# Patient Record
Sex: MALE | URBAN - METROPOLITAN AREA
[De-identification: names, ages, dates, MRNs, and addresses within clinical notes are randomized per-mention and may not be internally consistent; named-entity substitution may affect disease eponyms.]

---

## 2019-02-28 ENCOUNTER — IMPORTED ENCOUNTER (OUTPATIENT)
Dept: URBAN - METROPOLITAN AREA CLINIC 9 | Facility: CLINIC | Age: 84
End: 2019-02-28

## 2019-03-15 ENCOUNTER — IMPORTED ENCOUNTER (OUTPATIENT)
Dept: URBAN - METROPOLITAN AREA CLINIC 9 | Facility: CLINIC | Age: 84
End: 2019-03-15

## 2019-04-01 ENCOUNTER — IMPORTED ENCOUNTER (OUTPATIENT)
Dept: URBAN - METROPOLITAN AREA CLINIC 9 | Facility: CLINIC | Age: 84
End: 2019-04-01

## 2019-05-13 NOTE — PATIENT DISCUSSION
Consult noted for - home health RN for PICC care. I have sent referral in [employer] to [id number] [address].  Thanks JOSEFA Contreras Patient understands condition, prognosis and need for follow up care.

## 2019-05-28 NOTE — PATIENT DISCUSSION
Cataract surgery has been performed in the first eye and activities of daily living are still impaired. The patient would like to proceed with cataract surgery in the second eye as scheduled. The patient elects CV, OD goal of -2.00.

## 2020-11-16 ENCOUNTER — IMPORTED ENCOUNTER (OUTPATIENT)
Dept: URBAN - METROPOLITAN AREA CLINIC 9 | Facility: CLINIC | Age: 85
End: 2020-11-16

## 2020-11-25 ENCOUNTER — IMPORTED ENCOUNTER (OUTPATIENT)
Dept: URBAN - METROPOLITAN AREA CLINIC 9 | Facility: CLINIC | Age: 85
End: 2020-11-25

## 2020-11-25 PROBLEM — H40.1131: Noted: 2020-11-25

## 2021-10-19 ASSESSMENT — KERATOMETRY
OS_K2POWER_DIOPTERS: 45.75
OD_AXISANGLE_DEGREES: 98
OS_AXISANGLE_DEGREES: 82
OD_AXISANGLE_DEGREES: 103
OD_AXISANGLE2_DEGREES: 13
OD_K2POWER_DIOPTERS: 45.75
OS_K1POWER_DIOPTERS: 43.75
OD_K1POWER_DIOPTERS: 43.5
OS_AXISANGLE2_DEGREES: 172
OD_K2POWER_DIOPTERS: 46
OD_AXISANGLE2_DEGREES: 8
OD_K1POWER_DIOPTERS: 43.5
OS_K1POWER_DIOPTERS: 43.75
OS_AXISANGLE_DEGREES: 82
OS_K2POWER_DIOPTERS: 45.75
OS_AXISANGLE2_DEGREES: 172

## 2021-10-19 ASSESSMENT — VISUAL ACUITY
OD_CC: 20/25 -2 SN
OD_CC: 20/40 - SN
OS_CC: 20/40 - SN
OD_CC: 20/50 -2 SN
OD_CC: 20/30 -2 SN
OS_CC: 20/40 SN
OS_CC: 20/40 - SN
OD_CC: 20/40 -2 SN
OS_CC: 20/25 SN

## 2021-10-19 ASSESSMENT — TONOMETRY
OD_IOP_MMHG: 10
OS_IOP_MMHG: 13
OS_IOP_MMHG: 12
OD_IOP_MMHG: 11
OS_IOP_MMHG: 12
OD_IOP_MMHG: 10
OS_IOP_MMHG: 16
OS_IOP_MMHG: 10
OD_IOP_MMHG: 12
OD_IOP_MMHG: 10

## 2021-10-19 ASSESSMENT — PACHYMETRY
OS_CT_UM: 542.0
OD_CT_UM: 543.0

## 2022-10-31 ENCOUNTER — COMPREHENSIVE EXAM (OUTPATIENT)
Dept: URBAN - NONMETROPOLITAN AREA CLINIC 6 | Facility: CLINIC | Age: 87
End: 2022-10-31

## 2022-10-31 DIAGNOSIS — H26.493: ICD-10-CM

## 2022-10-31 DIAGNOSIS — H35.3112: ICD-10-CM

## 2022-10-31 DIAGNOSIS — D31.32: ICD-10-CM

## 2022-10-31 DIAGNOSIS — H35.373: ICD-10-CM

## 2022-10-31 DIAGNOSIS — H04.123: ICD-10-CM

## 2022-10-31 DIAGNOSIS — H40.1131: ICD-10-CM

## 2022-10-31 PROCEDURE — 92015 DETERMINE REFRACTIVE STATE: CPT

## 2022-10-31 PROCEDURE — 92134 CPTRZ OPH DX IMG PST SGM RTA: CPT

## 2022-10-31 PROCEDURE — 92014 COMPRE OPH EXAM EST PT 1/>: CPT

## 2022-10-31 PROCEDURE — 92250 FUNDUS PHOTOGRAPHY W/I&R: CPT

## 2022-10-31 ASSESSMENT — KERATOMETRY
OD_K1POWER_DIOPTERS: 43.25
OS_K1POWER_DIOPTERS: 43.75
OS_AXISANGLE2_DEGREES: 174
OD_AXISANGLE2_DEGREES: 8
OD_K2POWER_DIOPTERS: 46.25
OS_AXISANGLE_DEGREES: 84
OD_AXISANGLE_DEGREES: 98
OS_K2POWER_DIOPTERS: 45.75

## 2022-10-31 ASSESSMENT — VISUAL ACUITY
OU_CC: 20/30
OS_SC: 20/60
OD_SC: 20/60
OU_SC: 20/60
OS_GLARE: 20/100
OD_GLARE: 20/100
OS_CC: 20/40
OD_CC: 20/40

## 2022-10-31 ASSESSMENT — TONOMETRY
OS_IOP_MMHG: 15
OD_IOP_MMHG: 13

## 2024-10-07 ENCOUNTER — COMPREHENSIVE EXAM (OUTPATIENT)
Dept: URBAN - NONMETROPOLITAN AREA CLINIC 6 | Facility: CLINIC | Age: 89
End: 2024-10-07

## 2024-10-07 DIAGNOSIS — H26.493: ICD-10-CM

## 2024-10-07 DIAGNOSIS — H35.373: ICD-10-CM

## 2024-10-07 DIAGNOSIS — H04.123: ICD-10-CM

## 2024-10-07 DIAGNOSIS — H40.20X1: ICD-10-CM

## 2024-10-07 DIAGNOSIS — H35.3112: ICD-10-CM

## 2024-10-07 DIAGNOSIS — D31.32: ICD-10-CM

## 2024-10-07 PROCEDURE — 92134 CPTRZ OPH DX IMG PST SGM RTA: CPT

## 2024-10-07 PROCEDURE — 92014 COMPRE OPH EXAM EST PT 1/>: CPT

## 2024-10-07 PROCEDURE — 92250 FUNDUS PHOTOGRAPHY W/I&R: CPT

## 2024-11-15 ENCOUNTER — DIAGNOSTICS ONLY (OUTPATIENT)
Dept: URBAN - NONMETROPOLITAN AREA CLINIC 6 | Facility: CLINIC | Age: 89
End: 2024-11-15

## 2024-11-15 DIAGNOSIS — H40.20X1: ICD-10-CM

## 2024-11-15 PROCEDURE — 92083 EXTENDED VISUAL FIELD XM: CPT
